# Patient Record
Sex: MALE | Race: WHITE | URBAN - METROPOLITAN AREA
[De-identification: names, ages, dates, MRNs, and addresses within clinical notes are randomized per-mention and may not be internally consistent; named-entity substitution may affect disease eponyms.]

---

## 2019-08-26 ENCOUNTER — OFFICE VISIT (OUTPATIENT)
Dept: ORTHOPEDIC SURGERY | Age: 49
End: 2019-08-26
Payer: COMMERCIAL

## 2019-08-26 VITALS — HEIGHT: 69 IN | WEIGHT: 210 LBS | BODY MASS INDEX: 31.1 KG/M2 | RESPIRATION RATE: 16 BRPM

## 2019-08-26 DIAGNOSIS — M25.571 RIGHT ANKLE PAIN, UNSPECIFIED CHRONICITY: Primary | ICD-10-CM

## 2019-08-26 DIAGNOSIS — M19.079 ANKLE ARTHRITIS: ICD-10-CM

## 2019-08-26 PROCEDURE — 99213 OFFICE O/P EST LOW 20 MIN: CPT | Performed by: PHYSICIAN ASSISTANT

## 2019-08-26 RX ORDER — LEVOTHYROXINE SODIUM 0.05 MG/1
50 TABLET ORAL DAILY
COMMUNITY

## 2019-08-27 NOTE — PROGRESS NOTES
Subjective:      Patient ID: Derrick Nolen is a 44 y.o. male. Chief Complaint   Patient presents with    Ankle Pain     Right        HPI:   He is here in the [address]   for an initial evaluation of a new problem. Right ankle pain. He will look yesterday with stiffness in the right ankle. Today he has increased pain and swelling. He denies any traumatic event. However, was doing a lot of extensive yard work this past weekend on uneven ground. Pain Scale 6/10 VAS. Location of pain global right ankle. Pain is worse with weightbearing. Pain improves with ice and elevation. Previous treatments have included use of a tall boot today. Anti-inflammatories and Tylenol with mild relief. Review Of Systems:   A 14 point review of systems and history form completed by the patient has been reviewed. This scanned in the media tab of the patient's chart under today's date. As outlined in the HPI. Negative for fever or chills. Negative for joint pain, swelling and stiffness. Negative for numbness or tingling. History reviewed. No pertinent past medical history. History reviewed. No pertinent family history. History reviewed. No pertinent surgical history. Social History     Occupational History    Not on file   Tobacco Use    Smoking status: Never Smoker    Smokeless tobacco: Never Used   Substance and Sexual Activity    Alcohol use: Yes     Alcohol/week: 6.0 standard drinks     Types: 6 Cans of beer per week    Drug use: Never    Sexual activity: Not on file       Current Outpatient Medications   Medication Sig Dispense Refill    levothyroxine (SYNTHROID) 50 MCG tablet Take 50 mcg by mouth Daily      EPINEPHrine (ADRENALIN) 1 mg/mL injection Inject  into the skin as needed. No current facility-administered medications for this visit.           Objective:     He is alert, oriented x 3, pleasant, well nourished, developed and in no

## 2019-09-09 ENCOUNTER — OFFICE VISIT (OUTPATIENT)
Dept: ORTHOPEDIC SURGERY | Age: 49
End: 2019-09-09
Payer: COMMERCIAL

## 2019-09-09 VITALS — WEIGHT: 210 LBS | HEIGHT: 68 IN | BODY MASS INDEX: 31.83 KG/M2 | RESPIRATION RATE: 16 BRPM

## 2019-09-09 DIAGNOSIS — M79.671 RIGHT FOOT PAIN: Primary | ICD-10-CM

## 2019-09-09 PROCEDURE — 99213 OFFICE O/P EST LOW 20 MIN: CPT | Performed by: PHYSICIAN ASSISTANT

## 2019-09-10 ENCOUNTER — TELEPHONE (OUTPATIENT)
Dept: ORTHOPEDIC SURGERY | Age: 49
End: 2019-09-10

## 2019-09-10 NOTE — PROGRESS NOTES
over the proximal fourth and proximal fifth metatarsals. There is mild edema present in this region. There is mild tenderness over the peroneal tendons at their insertion. He is able to actively dorsi and plantarflex his foot with mild discomfort. Increased discomfort with active eversion of the foot against resistance. Negative flip test.      Examination of the lower extremities are intact with sensation to light touch. Motor testing  5/5 in all major motor groups of the lower extremities. Gait is normal heel to toe. Gait is antalgic. Negative Valencia's Sign. SLR negative. Examination of the lower extremities shows intact perfusion to all extremities. No cyanosis. Digits are warm to touch, capillary refill is less than 2 seconds. There is mild edema noted RLE     Examination of the skin over both lower extremities reveals: The skin to be intact without lacerations or abrasions. No significant erythema. No rashes or skin lesions. X Rays: performed in the office today:   AP, Lateral and Oblique of the Right Foot:  Radiographs demonstrate normal bony alignment of the foot. There is no radiographic evidence of an acute displaced fracture or dislocation. There is early degenerative changes noted of the tarsometatarsal joints. Diagnosis:        ICD-10-CM    1. Right foot pain M79.671 XR FOOT RIGHT (MIN 3 VIEWS)     MRI FOOT RIGHT WO CONTRAST        Assessment/ Plan:      Continued right ankle right foot pain. Possible peroneal tendinitis, possible pain due to early degenerative arthritis of the midfoot or possible stress fracture. Symptoms have been progressively worsening over the last several weeks. He has been wearing the boot off and on with some improvement. Recommend anti-inflammatory medications such as Advil or Aleve in therapeutic doses. The natural history of the patient's diagnosis as well as the treatment options were discussed in full and questions were answered.

## 2019-09-18 ENCOUNTER — OFFICE VISIT (OUTPATIENT)
Dept: ORTHOPEDIC SURGERY | Age: 49
End: 2019-09-18
Payer: COMMERCIAL

## 2019-09-18 VITALS — BODY MASS INDEX: 31.84 KG/M2 | WEIGHT: 210.1 LBS | HEIGHT: 68 IN

## 2019-09-18 DIAGNOSIS — M79.671 RIGHT FOOT PAIN: Primary | ICD-10-CM

## 2019-09-18 PROCEDURE — L1902 AFO ANKLE GAUNTLET PRE OTS: HCPCS | Performed by: ORTHOPAEDIC SURGERY

## 2019-09-18 PROCEDURE — 99203 OFFICE O/P NEW LOW 30 MIN: CPT | Performed by: ORTHOPAEDIC SURGERY

## 2019-09-18 RX ORDER — PREDNISONE 10 MG/1
TABLET ORAL
Qty: 14 TABLET | Refills: 0 | Status: SHIPPED | OUTPATIENT
Start: 2019-09-18

## 2019-09-19 NOTE — PROGRESS NOTES
Chief Complaint    New Patient (rt ankle pian, seen at [address] [place] [address] 9/9/19)      History of Present Illness:  Derrick Nolen is a 44 y.o. malehere for evaluation chief complaint of right ankle pain. He states this originally began on 8/26/2019 he just woke up with pain and does not remember doing anything the day before. He could hardly walk on it. He went to our after hours on 9/9/2019. He was sent for an MRI scan and is now here for further follow-up. Currently complains of 3-4 out of 10 pain. Being on it makes it worse getting off of it makes it better does not have a history of gout that he knows of and has not had a history of kidney stones      Medical History:  Patient's medications, allergies, past medical, surgical, social and family histories were reviewed and updated as appropriate. Review of Systems:  Pertinent items are noted in HPI  Review of systems reviewed from Patient History Form dated on 9/9/2019 and available in the patient's chart under the Media tab. Vital Signs:  Ht 5' 7.99\" (1.727 m)   Wt 210 lb 1.6 oz (95.3 kg)   BMI 31.95 kg/m²     General Exam:   Constitutional: Patient is adequately groomed with no evidence of malnutrition  DTRs: Deep tendon reflexes are intact  Mental Status: The patient is oriented to time, place and person. The patient's mood and affect are appropriate. Lymphatic: The lymphatic examination bilaterally reveals all areas to be without enlargement or induration. Foot Examination:    Inspection: No significant ecchymosis but he does have a mild effusion    Palpation: Tenderness throughout the ankle no one focal area    Range of Motion: 20 degrees of dorsiflexion 40 degrees of plantarflexion    Strength: Within normal limits    Special Tests: Anterior drawer and talar tilt show no gross laxity    Skin: There are no rashes, ulcerations or lesions.     Gait: Antalgic    Reflex 2+ and symmetric    Additional Comments:       Additional Examinations:         Left me in 4 weeks as needed.   If he does have another episode I would get laboratory work immediate

## 2021-11-03 ENCOUNTER — HOSPITAL ENCOUNTER (OUTPATIENT)
Dept: PHYSICAL THERAPY | Age: 51
Setting detail: THERAPIES SERIES
Discharge: HOME | End: 2021-11-03
Payer: COMMERCIAL

## 2021-11-03 PROCEDURE — 97161 PT EVAL LOW COMPLEX 20 MIN: CPT | Performed by: PHYSICAL THERAPIST

## 2021-11-03 PROCEDURE — 97110 THERAPEUTIC EXERCISES: CPT | Performed by: PHYSICAL THERAPIST
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
## 2021-11-05 ENCOUNTER — HOSPITAL ENCOUNTER (OUTPATIENT)
Dept: PHYSICAL THERAPY | Age: 51
Setting detail: THERAPIES SERIES
End: 2021-11-05
Payer: COMMERCIAL

## 2021-11-12 ENCOUNTER — HOSPITAL ENCOUNTER (OUTPATIENT)
Dept: PHYSICAL THERAPY | Age: 51
Setting detail: THERAPIES SERIES
End: 2021-11-12
Payer: COMMERCIAL

## 2021-11-15 ENCOUNTER — APPOINTMENT (OUTPATIENT)
Dept: PHYSICAL THERAPY | Age: 51
End: 2021-11-15
Payer: COMMERCIAL

## 2021-11-19 ENCOUNTER — APPOINTMENT (OUTPATIENT)
Dept: PHYSICAL THERAPY | Age: 51
End: 2021-11-19
Payer: COMMERCIAL

## 2021-11-22 ENCOUNTER — APPOINTMENT (OUTPATIENT)
Dept: PHYSICAL THERAPY | Age: 51
End: 2021-11-22
Payer: COMMERCIAL

## 2021-11-24 ENCOUNTER — APPOINTMENT (OUTPATIENT)
Dept: PHYSICAL THERAPY | Age: 51
End: 2021-11-24
Payer: COMMERCIAL

## 2022-04-08 LAB
C-REACTIVE PROTEIN WIDE RANGE: 56.4 MG/L (ref 0–5)
CYCLIC CITRULLINATED PEPTIDE ANTIBODY IGG: 1 U/ML (ref 0–4)
ERYTHROCYTE SEDIMENTATION RATE: 10 MM/HR (ref 0–20)
RHEUMATOID FACTOR: <15 IU/ML (ref 0–14)
URIC ACID, SERUM: 9 MG/DL (ref 3.5–7.2)

## 2022-04-11 LAB
ANTI-NUCLEAR ANTIBODY (ANA): NEGATIVE
HLA B27: NEGATIVE

## 2022-05-03 ENCOUNTER — OFFICE VISIT (OUTPATIENT)
Dept: RHEUMATOLOGY | Age: 52
End: 2022-05-03
Payer: COMMERCIAL

## 2022-05-03 VITALS
BODY MASS INDEX: 32.96 KG/M2 | HEIGHT: 67 IN | WEIGHT: 210 LBS | DIASTOLIC BLOOD PRESSURE: 78 MMHG | SYSTOLIC BLOOD PRESSURE: 120 MMHG

## 2022-05-03 DIAGNOSIS — E79.0 HYPERURICEMIA: ICD-10-CM

## 2022-05-03 DIAGNOSIS — M02.361 REACTIVE ARTHRITIS OF RIGHT KNEE (HCC): Primary | ICD-10-CM

## 2022-05-03 PROCEDURE — 99204 OFFICE O/P NEW MOD 45 MIN: CPT | Performed by: INTERNAL MEDICINE

## 2022-05-03 NOTE — PROGRESS NOTES
[address], [place]                                                           [address] [place] [address]                                                             [phone] (P) [phone] (F)      Note is transcribed using voice recognition software. Inadvertent computerized transcription errors may be present. Patient identification: Yasmin Garcia, fish : 1970,51 y.o. REASON FOR CONSULTATION:  Patient is being seen at the request of  No primary care provider on file. / Early, Ender Monteiro MD for evaluation and management of right knee pain and swelling. HISTORY OF PRESENT ILLNESS:  46 y.o. male with history of hypothyroidism has been having urinary problems since February this year-had urinary retention twice in February, was diagnosed with cystitis, treated with levofloxacin followed by Keflex. Then he was diagnosed with prostatitis couple weeks ago, is on doxycycline. He noticed rather abrupt onset of right knee discomfort while he was in an airplane on , right knee pain, stiffness and swelling got worse slowly over days as he kept on walking during his vacation. He returned in town on , and right knee was quite swollen, inflamed and stiff. Evaluated by orthopedics on -underwent right knee arthrocentesis-90 cc of liquid was drained, joint fluid analysis-WBC count 8398, negative crystals, and cultures. Patient did not receive intra-articular steroid injection in his right knee. Over time, right knee symptoms subsided, and is completely asymptomatic at this time. Lab work revealed hyperuricemia.   No history of acute gout in the past.  Does not recall having any significant musculoskeletal discomfort in the past.  No history of infections other than a fall. No history of tick exposure. Denies any psoriasis or inflammatory bowel diseases. No rashes, photosensitivity. Laboratory evaluation elevated CRP, normal sed rate, RF, CCP, JIMMY, HLA-B27. All other ROS are negative. PMH, PSH,Social history , Meds reviewed. FH-no family history of autoimmune rheumatic disorders. PHYSICAL EXAM:    Vitals:    /78   Ht 5' 7\" (1.702 m)   Wt 210 lb (95.3 kg)   BMI 32.89 kg/m²   AA)x3 well nourished, and well groomed, normal judgement. MKS: Normal musculoskeletal in upper, lower extremities including his right knee. No appreciable tender, swollen, inflamed joints in upper or lower extremities, full range of motion of peripheral joints. Skin: No rashes, no induration or skin thickening or nodules. DATA:       ASSESSMENT AND PLAN:  Sophia Lan was seen today for gout and establish care. Diagnoses and all orders for this visit:    Reactive arthritis of right knee (HCC)    Hyperuricemia      Acute onset of right knee pain, swelling(April 1 week 2022)-joint fluid aspiration low-grade inflammation-White count 8000-self-limiting without any intervention. Given the sequence of his symptoms, reactive arthritis is the likely diagnosis. Since he is completely asymptomatic, no need for any further diagnostic or therapeutic intervention. If symptoms were to recur, advised patient to take over-the-counter ibuprofen 600 mg 3 times a day for 5 to 7 days. If symptoms are persistent, he is advised to call us. Asymptomatic hyperuricemia without gout. Common findings in normal population. No need for urate lowering agent at this time. If he notices sudden onset of pain, swelling in any joints, he is advised to call us. Follow-up as needed. #################################################################################################  Patient indicates understanding and agrees with the management plan.   Total time >60 minutes that includes the following-  Preparing to see the patient such as reviewing patients records including orthopedic notes, labs, history, imaging studies, pre-charting, preparing the visit on the same day, performing a medically appropriate history and physical examination, counseling and educating patient about diagnosis, management plan, ordering appropriate testings, prescriptions, communicating findings to other care providers, and documenting clinical information in electronic medical record. I thank you for giving me the opportunity to be involved in [address] [place] [address] and I look forward following Malvina Gosselin along with you. If you have any questions or concerns please feel free to contact me at any time.   Ayden Roman MD 05/03/22